# Patient Record
(demographics unavailable — no encounter records)

---

## 2024-12-02 NOTE — HISTORY OF PRESENT ILLNESS
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

## 2024-12-02 NOTE — ASSESSMENT
[FreeTextEntry1] : 54 yo F with microhematuria  - Obtain labwork from PCP - UA, culture - Discussed possible etiologies for microhematuria including benign (UTI, nephrolithiasis, cyst, BPH) vs malignancy (renal, ureteral and bladder). Discussed hematuria workup which includes upper tract imaging such as US or CT urogram and cystoscopy. Discussed risk and benefits of cystoscopy. - Once microhematuria confirmed, will proceed with workup